# Patient Record
(demographics unavailable — no encounter records)

---

## 2024-11-13 NOTE — ASSESSMENT
[FreeTextEntry1] : 58 year old with LUTS, elevated PSA, history nephrolithiasis, low T.  With regards to history of elevated PSA, patient has strong family history prostate cancer with PSA 2.8 8/2024 (3.2 7/2023), most recent MRI 7/2023 PIRADS 1 with favorable density, prior negative biopsy 2022, and negative genetic testing. We discussed options: 1) observation for now given has been in this range since 2022, favorable density, prior negative biopsy, 2) repeat MRI at this time, or 3) select MDX to better risk stratify given strong family history.  Patient elected select MDX - low risk. Options discussed - will continue to monitor given PSA largely stable and low risk. Will plan for repeat PSA 6 months from prior (2/2025)  With regards to LUTS, we discussed the following options for managing the patient's lower urinary tract symptoms (LUTS) due to BPH:  (1) Behavioral modification: timed and double voiding, reduced oral fluid intake at night, avoid bladder irritants (caffeine, alcohol, smoking, spicy foods)  (2) Medical therapy: medication classes include alpha-blockers, 5-alpha reductase inhibitors, anticholinergics, PDE-5 inhibitors (Tadalafil is approved for co-treatment of BPH and erectile dysfunction), and combination therapy  (3) Surgical intervention: TURP (transurethral resection of the prostate), UroLIFT (prostatic urethral lift), Rezum (water vapor ablation of prostate), PVP (photovaporization of prostate), enucleation of the prostate, and simple prostatectomy   Patient not interested in surgery at this time. Flomax caused dizziness. Silodosin bothersome retrograde. Interested in trialing daily tadalafil again - felt it was somewhat helpful but was not consistent with it. The most common adverse reactions (>2%) of Cialis include headache, dyspepsia, back pain, myalgia, nasal congestion, flushing and pain in limb. There is also a risk of prolonged erection, particularly those at increased risk for priapism. He was informed that this medication could potentiate the effects of high blood pressure medications. He should stop the medication and contact medical help if any issues or concerns with treatment. He denies prior history of cardiac disease, chest pain, or use of nitrates.   History nephrolithiasis: renal US 8/2024: an echogenic focus with distal shadow and twinkle artifact measuring 4 mm visualized in the lower pole of left kidney. Options discussed - observation vs URS, vs ESWL - patient would like to continue to monitor at this time. Warning precautions discussed, plan for renal US 6 months (2/2025)  Low T - per PCP, patient to let me know if would like referral.  Plan:  - Daily tadalafil - f/u 1 months to assess symptoms on tadalafil - patient not interested in surgery at this time, potentially interested in REZUM in future - PSA 2/2025 - renal US 2/2025, warning precautions discussed

## 2024-11-13 NOTE — HISTORY OF PRESENT ILLNESS
[FreeTextEntry1] : Name TIAGO DANGELO  MRN [id number]   1966  Contact Number: [phone] ----------------------------------------------------------------------------------------------------------------------------------------- Date of First Visit: 24 Referring Provider/PCP: Dr. Melinda hayden: [phone] -----------------------------------------------------------------------------------------------------------------------------------------  CC: BPH, low T  History of Present Illness: TIAGO DANGELO is a 58 year old male who presents to [facility]. Patient has strong family history of lethal prostate cancer. In , PSA was rising to 1.6 at that time and nodule was felt on rectal exam. MRI 22: PIRADS 3 12 x8mm L mid TZa lesion. Underwent transperineal targeted prostate biopsy 22 - benign. Then a year later PSA 3.2 and had another MRI 2023 PSA 3.2 PIRADS 1 with resolution of previous lesion. Patient reports had genetic testing and was negative for any mutations. PSA has since decreased 2.6, recently 2.8.  Patient reports a few years history of bothersome LUTS. Specifically, he complains of urinary frequency, post void dribbling, stream can be weak, feeling of incomplete emptying, feels like still needs to go and nothing comes out. Has been on daily tadalafil, but has not fully compliant with daily use. Denies history of urinary retention. Reports a few years ago felt had an infection, was prescribed cipro but did not take and symptoms resolved on their own. No issues with erections.  Patient also reports had a kidney stone about 3 years ago, passed on its own. No issues since. Office US today: an echogenic focus with distal shadow and twinkle artifact measuring 4 mm visualized in the lower pole of left kidney.  Patient also has history low T - managed by PCP on T gel.  PSA Trend: 2021: 1.57 22: 2.6 23: 3.2 2023: 2.6 24: 2.8 (PSAD 0.08)  MRI: 2023: [facility] Prostate size: 3.9 cm x 4.0 cm x 4.6 cm for an overall volume of 37.3 cc (series 3, image 16 and series 7, image 16) Intra-vesical protrusion: None. Peripheral zone hemorrhage: None. No focal abnormality suspicious for tumor is identified. Previously described left anterior transition zone lesion is not identified on the present examination.  Additional peripheral zone findings: None Additional transition zone findings: Enlarged and heterogeneous in appearance, consistent with BPH. Extraprostatic extension: See above. Seminal vesicle invasion: No evidence of seminal vesicle invasion. Lymph nodes: No pathologic pelvic lymph nodes. Osseous structures: No aggressive osseous lesion. Additional findings: None. IMPRESSION: No findings on MRI suspicious for prostate tumor. PI-RADS 1: clinically significant tumor very unlikely. Previously described left anterior transition zone lesion is not identified on the present examination.   MRI 22: [facility] Prostate size: 4.2 [CC] x 3.6 [AP] x 4.5 [transverse] cm for an overall volume of 35 cc. Intra-vesical protrusion: None. Peripheral zone hemorrhage: None Lesion localization: LESION: 1 PI-RADS Assessment Category: 3, Intermediate (presence of clinically significant cancer equivocal) T2-weighted images: 3 (heterogeneous signal intensity with obscured margins; other findings not qualifying as 2, 4, or 5) Diffusion-weighted images: 3 (focal [discrete and different from background] hypointense on ADC and/or focal hyperintense on high b-value DWI; may be markedly hypointense on ADC or markedly hyperintense on high b-value DWI, but not both) Size: 12 x 8 mm as measured on image 24 of series 12 (ADC map) Side: Left Location within transverse plane: Anterior Level of prostate: Midgland Zone: Transition Extra-prostatic extension: Does not abut capsule Additional peripheral zone findings: None Additional transition zone findings: Enlarged and heterogeneous in appearance, consistent with BPH. Extraprostatic extension: No evidence of EPE. Seminal vesicle invasion: No evidence of seminal vesicle invasion. Lymph nodes: No pathologic pelvic lymph nodes. Osseous structures: No aggressive osseous lesion. Additional findings: None. IMPRESSION: 12 x 8 mm left anterior midgland transition zone lesion. PI-RADS 3, intermediate (clinically significant cancer equivocal).  IPSS 20 QOL 4 ALCIDES 18 PVR (to ensure adequate emptying): 203 ---------------------------------------------------------------------------------------------------------------------------------------- Interval History (10/02/2024):  Select MDX very low risk.  Started Flomax last visit. 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 Read about surgical options - not interested at this time.   Uroflow: plateau curve, voided 523, Qmax 13.8, classification obstructed IPSS: 25 QOL 4 ALCIDES 3 - has not been active PVR (to ensure adequate emptying): 147 ---------------------------------------------------------------------------------------------------------------------------------------- Interval History (2024):  Last visit switch to silodosin. Patient reports improvement in flow on the medication, not as much change in frequency. No dizziness. Reports was very bothered by the retrograde ejaculation and stopped the medication. Interested in trying daily tadalafil again (previously was not compliant on it). Not interested in surgery at this time, potentially interested in REZUM in new year.  Uroflow: voided 262cc, QMax 9.7 classification obstructed IPSS 20 QOL 4 ALCIDES  3 PVR (to ensure adequate emptying): 108 ---------------------------------------------------------------------------------------------------------------------------------------- PMH: HTN, stroke (no residual deficits, 2023), PFO PSH: PFO s/p repair, bicep tear s/p repair Family History: prostate cancer father - lethal,  at 81, 2 paternal uncles with prostate cancer -  from heart attack, another from prostate cancer in 70s, 2 maternal uncles with prostate cancer - alive and treated Social: , 3 children, semi retired finance, never smoker, rare alcohol Meds: atorvastatin, tadalafil, T gel, aspirin, losartan, vit D, coq10 Allergies: NKDA ROS: no feves, chills

## 2024-11-13 NOTE — LETTER BODY
[Dear  ___] : Dear  [unfilled], [Courtesy Letter:] : I had the pleasure of seeing your patient, [unfilled], in my office today. [Please see my note below.] : Please see my note below. [Consult Closing:] : Thank you very much for allowing me to participate in the care of this patient.  If you have any questions, please do not hesitate to contact me. [Sincerely,] : Sincerely, [FreeTextEntry3] : Lina Lopez MD Director of Robotic Education [facility]   [email or web address] [phone]

## 2024-12-20 NOTE — HISTORY OF PRESENT ILLNESS
[FreeTextEntry1] : Name TIAGO DANGELO  MRN [id number]   1966  Contact Number: [phone] ----------------------------------------------------------------------------------------------------------------------------------------- Date of First Visit: 24 Referring Provider/PCP: Dr. Melinda hayden: [phone] -----------------------------------------------------------------------------------------------------------------------------------------  CC: BPH, low T  History of Present Illness: TIAGO DANGELO is a 58 year old male who presents to [facility]. Patient has strong family history of lethal prostate cancer. In , PSA was rising to 1.6 at that time and nodule was felt on rectal exam. MRI 22: PIRADS 3 12 x8mm L mid TZa lesion. Underwent transperineal targeted prostate biopsy 22 - benign. Then a year later PSA 3.2 and had another MRI 2023 PSA 3.2 PIRADS 1 with resolution of previous lesion. Patient reports had genetic testing and was negative for any mutations. PSA has since decreased 2.6, recently 2.8.  Patient reports a few years history of bothersome LUTS. Specifically, he complains of urinary frequency, post void dribbling, stream can be weak, feeling of incomplete emptying, feels like still needs to go and nothing comes out. Has been on daily tadalafil, but has not fully compliant with daily use. Denies history of urinary retention. Reports a few years ago felt had an infection, was prescribed cipro but did not take and symptoms resolved on their own. No issues with erections.  Patient also reports had a kidney stone about 3 years ago, passed on its own. No issues since. Office US today: an echogenic focus with distal shadow and twinkle artifact measuring 4 mm visualized in the lower pole of left kidney.  Patient also has history low T - managed by PCP on T gel.  PSA Trend: 2021: 1.57 22: 2.6 23: 3.2 2023: 2.6 24: 2.8 (PSAD 0.08)  MRI: 2023: [facility] Prostate size: 3.9 cm x 4.0 cm x 4.6 cm for an overall volume of 37.3 cc (series 3, image 16 and series 7, image 16) Intra-vesical protrusion: None. Peripheral zone hemorrhage: None. No focal abnormality suspicious for tumor is identified. Previously described left anterior transition zone lesion is not identified on the present examination.  Additional peripheral zone findings: None Additional transition zone findings: Enlarged and heterogeneous in appearance, consistent with BPH. Extraprostatic extension: See above. Seminal vesicle invasion: No evidence of seminal vesicle invasion. Lymph nodes: No pathologic pelvic lymph nodes. Osseous structures: No aggressive osseous lesion. Additional findings: None. IMPRESSION: No findings on MRI suspicious for prostate tumor. PI-RADS 1: clinically significant tumor very unlikely. Previously described left anterior transition zone lesion is not identified on the present examination.   MRI 22: [facility] Prostate size: 4.2 [CC] x 3.6 [AP] x 4.5 [transverse] cm for an overall volume of 35 cc. Intra-vesical protrusion: None. Peripheral zone hemorrhage: None Lesion localization: LESION: 1 PI-RADS Assessment Category: 3, Intermediate (presence of clinically significant cancer equivocal) T2-weighted images: 3 (heterogeneous signal intensity with obscured margins; other findings not qualifying as 2, 4, or 5) Diffusion-weighted images: 3 (focal [discrete and different from background] hypointense on ADC and/or focal hyperintense on high b-value DWI; may be markedly hypointense on ADC or markedly hyperintense on high b-value DWI, but not both) Size: 12 x 8 mm as measured on image 24 of series 12 (ADC map) Side: Left Location within transverse plane: Anterior Level of prostate: Midgland Zone: Transition Extra-prostatic extension: Does not abut capsule Additional peripheral zone findings: None Additional transition zone findings: Enlarged and heterogeneous in appearance, consistent with BPH. Extraprostatic extension: No evidence of EPE. Seminal vesicle invasion: No evidence of seminal vesicle invasion. Lymph nodes: No pathologic pelvic lymph nodes. Osseous structures: No aggressive osseous lesion. Additional findings: None. IMPRESSION: 12 x 8 mm left anterior midgland transition zone lesion. PI-RADS 3, intermediate (clinically significant cancer equivocal).  IPSS 20 QOL 4 ALCIDES 18 PVR (to ensure adequate emptying): 203 ---------------------------------------------------------------------------------------------------------------------------------------- Interval History (10/02/2024):  Select MDX very low risk.  Started Flomax last visit. 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 Read about surgical options - not interested at this time.   Uroflow: plateau curve, voided 523, Qmax 13.8, classification obstructed IPSS: 25 QOL 4 ALCIDES 3 - has not been active PVR (to ensure adequate emptying): 147 ---------------------------------------------------------------------------------------------------------------------------------------- Interval History (2024):  Last visit switch to silodosin. Patient reports improvement in flow on the medication, not as much change in frequency. No dizziness. Reports was very bothered by the retrograde ejaculation and stopped the medication. Interested in trying daily tadalafil again (previously was not compliant on it). Not interested in surgery at this time, potentially interested in REZUM in new year.  Uroflow: voided 262cc, QMax 9.7 classification obstructed IPSS 20 QOL 4 ALCIDES  3 PVR (to ensure adequate emptying): 108 ---------------------------------------------------------------------------------------------------------------------------------------- Interval History (2024):  Last visit started daily tadalfil. Reports tolerating well. Feels urination somewhat improved. Frequency, intermittency, stream maybe a little betetr with Cialis. Most bothersome aspect is the frequency because inconvenience.  Uroflow: voided 362 Qmax 16 classification normal IPSS 17 QOL 4 ALCIDES 3 PVR (to ensure adequate emptying): 199 ---------------------------------------------------------------------------------------------------------------------------------------- PMH: HTN, stroke (no residual deficits, 2023), PFO PSH: PFO s/p repair, bicep tear s/p repair Family History: prostate cancer father - lethal,  at 81, 2 paternal uncles with prostate cancer -  from heart attack, another from prostate cancer in 70s, 2 maternal uncles with prostate cancer - alive and treated Social: , 3 children, semi retired finance, never smoker, rare alcohol Meds: atorvastatin, tadalafil, T gel, aspirin, losartan, vit D, coq10 Allergies: NKDA ROS: no feves, chills

## 2024-12-20 NOTE — LETTER BODY
[Dear  ___] : Dear  [unfilled], [Courtesy Letter:] : I had the pleasure of seeing your patient, [unfilled], in my office today. [Please see my note below.] : Please see my note below. [Consult Closing:] : Thank you very much for allowing me to participate in the care of this patient.  If you have any questions, please do not hesitate to contact me. [Sincerely,] : Sincerely, [FreeTextEntry3] : Lina Lopez MD Director of Robotic Education [facility] at [facility]   [email or web address] [phone]

## 2024-12-20 NOTE — ASSESSMENT
[FreeTextEntry1] : 58 year old with LUTS, elevated PSA, history nephrolithiasis, low T.  With regards to history of elevated PSA, patient has strong family history prostate cancer with PSA 2.8 8/2024 (3.2 7/2023), most recent MRI 7/2023 PIRADS 1 with favorable density, prior negative biopsy 2022, and negative genetic testing. We discussed options: 1) observation for now given has been in this range since 2022, favorable density, prior negative biopsy, 2) repeat MRI at this time, or 3) select MDX to better risk stratify given strong family history.  Patient elected select MDX - low risk. Options discussed - will continue to monitor given PSA largely stable and low risk. Will plan for repeat PSA 6 months from prior (2/2025)  With regards to LUTS, we discussed the following options for managing the patient's lower urinary tract symptoms (LUTS) due to BPH:  (1) Behavioral modification: timed and double voiding, reduced oral fluid intake at night, avoid bladder irritants (caffeine, alcohol, smoking, spicy foods)  (2) Medical therapy: medication classes include alpha-blockers, 5-alpha reductase inhibitors, anticholinergics, PDE-5 inhibitors (Tadalafil is approved for co-treatment of BPH and erectile dysfunction), and combination therapy  (3) Surgical intervention: TURP (transurethral resection of the prostate), UroLIFT (prostatic urethral lift), Rezum (water vapor ablation of prostate), PVP (photovaporization of prostate), enucleation of the prostate, and simple prostatectomy   Patient not interested in surgery at this time. Flomax caused dizziness. Silodosin bothersome retrograde. Trial of Cialis - feels helping - uroflow improved but still with elevated PVR. Patient would like to stay on Cialis daily for a little longer to determine impact. Discussed risks of incomplete emptying - infections, kidney issues, bladder failure. Will watch closely. Re surgery concerned re risk retrograde. Considering REZUM though understands still small risk. Discussed need for TRUS/cysto if more interested in procedure but wants to see impact Cialis for now.  History nephrolithiasis: renal US 8/2024: an echogenic focus with distal shadow and twinkle artifact measuring 4 mm visualized in the lower pole of left kidney. Options discussed - observation vs URS, vs ESWL - patient would like to continue to monitor at this time. Warning precautions discussed, plan for renal US 6 months (2/2025)  Low T - per PCP, patient to let me know if would like referral.  Plan:  - Continue daily tadalafil - patient not interested in surgery at this time, potentially interested in REZUM in future - would need cysto/TRUS - wants to hold - PSA 2/2025 - renal US 2/2025, warning precautions discussed - will reassess LUTS at Feb f/u

## 2025-02-26 NOTE — LETTER BODY
[Dear  ___] : Dear  [unfilled], [Courtesy Letter:] : I had the pleasure of seeing your patient, [unfilled], in my office today. [Please see my note below.] : Please see my note below. [Consult Closing:] : Thank you very much for allowing me to participate in the care of this patient.  If you have any questions, please do not hesitate to contact me. [Sincerely,] : Sincerely, [FreeTextEntry3] : Lina Lopez MD Director of Robotic Education [facility] at [facility]   [email or web address] [phone]

## 2025-02-26 NOTE — ASSESSMENT
[FreeTextEntry1] : 58 year old with LUTS, elevated PSA, history nephrolithiasis, low T.  With regards to history of elevated PSA, patient has strong family history prostate cancer with PSA 2.8 8/2024 (3.2 7/2023), most recent MRI 7/2023 PIRADS 1 with favorable density, prior negative biopsy 2022, and negative genetic testing. We discussed options: 1) observation for now given has been in this range since 2022, favorable density, prior negative biopsy, 2) repeat MRI at this time, or 3) select MDX to better risk stratify given strong family history.  Patient elected select MDX - low risk. Options discussed - will continue to monitor given PSA largely stable and low risk. Repeat PSA 2/25 2.4. Will continue to monitor - repeat PSA 6-12 months.  With regards to LUTS, we discussed the following options for managing the patient's lower urinary tract symptoms (LUTS) due to BPH:  (1) Behavioral modification: timed and double voiding, reduced oral fluid intake at night, avoid bladder irritants (caffeine, alcohol, smoking, spicy foods)  (2) Medical therapy: medication classes include alpha-blockers, 5-alpha reductase inhibitors, anticholinergics, PDE-5 inhibitors (Tadalafil is approved for co-treatment of BPH and erectile dysfunction), and combination therapy  (3) Surgical intervention: TURP (transurethral resection of the prostate), UroLIFT (prostatic urethral lift), Rezum (water vapor ablation of prostate), PVP (photovaporization of prostate), enucleation of the prostate, and simple prostatectomy   Patient not interested in surgery at this time. Flomax caused dizziness. Silodosin bothersome retrograde. Trial of Cialis - feels helping - symptoms improved but still with elevated PVR. Patient would like to stay on Cialis daily - not interested in adding another medication at this time. Discussed risks of incomplete emptying - infections, kidney issues, bladder failure. Will watch closely. Re surgery concerned re risk retrograde. Considering REZUM though understands still small risk. Discussed need for TRUS/cysto if more interested in procedure but wants to see impact Cialis for now. Wants to revisit TRUS/cysto in near future.  History nephrolithiasis: renal US 8/2024: an echogenic focus with distal shadow and twinkle artifact measuring 4 mm visualized in the lower pole of left kidney. US today: mm lower pole, upper pole 3.5, punctate in midpole. Options discussed - observation vs URS, vs ESWL. Given increase in size will proceed with CXT stone [place]illo at this time to better evaluate and then discuss options in detail.  Low T - per PCP, patient to let me know if would like referral.  Plan:  - Continue daily tadalafil - patient not interested in surgery at this time, potentially interested in REZUM in future - would need cysto/TRUS - will consider after next visit - PSA 6-12 months - CT stone umrillo - patient having hemorrhoid surgery next week wants to recover and will return to review ~6 weeks - will consider TRUS/cysto in future - will find out timing after hemorrhoid surgery re TRUS - fu 6 weeks

## 2025-02-26 NOTE — HISTORY OF PRESENT ILLNESS
[FreeTextEntry1] : Name TIAGO DANGELO  MRN [id number]   1966  Contact Number: [phone] ----------------------------------------------------------------------------------------------------------------------------------------- Date of First Visit: 24 Referring Provider/PCP: Dr. Melinda hayden: [phone] -----------------------------------------------------------------------------------------------------------------------------------------  CC: BPH, low T  History of Present Illness: TIAGO DANGELO is a 58 year old male who presents to [facility]. Patient has strong family history of lethal prostate cancer. In , PSA was rising to 1.6 at that time and nodule was felt on rectal exam. MRI 22: PIRADS 3 12 x8mm L mid TZa lesion. Underwent transperineal targeted prostate biopsy 22 - benign. Then a year later PSA 3.2 and had another MRI 2023 PSA 3.2 PIRADS 1 with resolution of previous lesion. Patient reports had genetic testing and was negative for any mutations. PSA has since decreased 2.6, recently 2.8.  Patient reports a few years history of bothersome LUTS. Specifically, he complains of urinary frequency, post void dribbling, stream can be weak, feeling of incomplete emptying, feels like still needs to go and nothing comes out. Has been on daily tadalafil, but has not fully compliant with daily use. Denies history of urinary retention. Reports a few years ago felt had an infection, was prescribed cipro but did not take and symptoms resolved on their own. No issues with erections.  Patient also reports had a kidney stone about 3 years ago, passed on its own. No issues since. Office US today: an echogenic focus with distal shadow and twinkle artifact measuring 4 mm visualized in the lower pole of left kidney.  Patient also has history low T - managed by PCP on T gel.  PSA Trend: 2021: 1.57 22: 2.6 23: 3.2 2023: 2.6 24: 2.8 (PSAD 0.08)  MRI: 2023: [facility] Prostate size: 3.9 cm x 4.0 cm x 4.6 cm for an overall volume of 37.3 cc (series 3, image 16 and series 7, image 16) Intra-vesical protrusion: None. Peripheral zone hemorrhage: None. No focal abnormality suspicious for tumor is identified. Previously described left anterior transition zone lesion is not identified on the present examination.  Additional peripheral zone findings: None Additional transition zone findings: Enlarged and heterogeneous in appearance, consistent with BPH. Extraprostatic extension: See above. Seminal vesicle invasion: No evidence of seminal vesicle invasion. Lymph nodes: No pathologic pelvic lymph nodes. Osseous structures: No aggressive osseous lesion. Additional findings: None. IMPRESSION: No findings on MRI suspicious for prostate tumor. PI-RADS 1: clinically significant tumor very unlikely. Previously described left anterior transition zone lesion is not identified on the present examination.   MRI 22: [facility] Prostate size: 4.2 [CC] x 3.6 [AP] x 4.5 [transverse] cm for an overall volume of 35 cc. Intra-vesical protrusion: None. Peripheral zone hemorrhage: None Lesion localization: LESION: 1 PI-RADS Assessment Category: 3, Intermediate (presence of clinically significant cancer equivocal) T2-weighted images: 3 (heterogeneous signal intensity with obscured margins; other findings not qualifying as 2, 4, or 5) Diffusion-weighted images: 3 (focal [discrete and different from background] hypointense on ADC and/or focal hyperintense on high b-value DWI; may be markedly hypointense on ADC or markedly hyperintense on high b-value DWI, but not both) Size: 12 x 8 mm as measured on image 24 of series 12 (ADC map) Side: Left Location within transverse plane: Anterior Level of prostate: Midgland Zone: Transition Extra-prostatic extension: Does not abut capsule Additional peripheral zone findings: None Additional transition zone findings: Enlarged and heterogeneous in appearance, consistent with BPH. Extraprostatic extension: No evidence of EPE. Seminal vesicle invasion: No evidence of seminal vesicle invasion. Lymph nodes: No pathologic pelvic lymph nodes. Osseous structures: No aggressive osseous lesion. Additional findings: None. IMPRESSION: 12 x 8 mm left anterior midgland transition zone lesion. PI-RADS 3, intermediate (clinically significant cancer equivocal).  IPSS 20 QOL 4 ALCIDES 18 PVR (to ensure adequate emptying): 203 ---------------------------------------------------------------------------------------------------------------------------------------- Interval History (10/02/2024):  Select MDX very low risk.  Started Flomax last visit. 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 Read about surgical options - not interested at this time.   Uroflow: plateau curve, voided 523, Qmax 13.8, classification obstructed IPSS: 25 QOL 4 ALCIDES 3 - has not been active PVR (to ensure adequate emptying): 147 ---------------------------------------------------------------------------------------------------------------------------------------- Interval History (2024):  Last visit switch to silodosin. Patient reports improvement in flow on the medication, not as much change in frequency. No dizziness. Reports was very bothered by the retrograde ejaculation and stopped the medication. Interested in trying daily tadalafil again (previously was not compliant on it). Not interested in surgery at this time, potentially interested in REZUM in new year.  Uroflow: voided 262cc, QMax 9.7 classification obstructed IPSS 20 QOL 4 ALCIDES  3 PVR (to ensure adequate emptying): 108 ---------------------------------------------------------------------------------------------------------------------------------------- Interval History (2024):  Last visit started daily tadalafil. Reports tolerating well. Feels urination somewhat improved. Frequency, intermittency, stream maybe a little better with Cialis. Most bothersome aspect is the frequency because inconvenience.  Uroflow: voided 362 Qmax 16 classification normal IPSS 17 QOL 4 ALCIDES 3 PVR (to ensure adequate emptying): 199 ---------------------------------------------------------------------------------------------------------------------------------------- Interval History (2025):  Continues on daily tadalafil - reports tolerating well - feels flow improved - still with frequency, nocturia 1x. Feels happy on Cialis given no side effect and improvement in urination though not perfect.  US today: 4mm lower pole, upper pole 3.5, punctate in midpole - denies any stone related symptoms.  PSA 2.4 25  IPSS: 14 QOL 4 ALCIDES: 3 Uroflow voided 491 Qmax 14.4 PVR (to ensure adequate emptying): 206 ---------------------------------------------------------------------------------------------------------------------------------------- PMH: HTN, stroke (no residual deficits, 2023), PFO PSH: PFO s/p repair, bicep tear s/p repair Family History: prostate cancer father - lethal,  at 81, 2 paternal uncles with prostate cancer -  from heart attack, another from prostate cancer in 70s, 2 maternal uncles with prostate cancer - alive and treated Social: , 3 children, semi retired finance, never smoker, rare alcohol Meds: atorvastatin, tadalafil, T gel, aspirin, losartan, vit D, coq10 Allergies: NKDA ROS: no fevers, chills  PSA Trend: 2021: 1.57 22: 2.6 23: 3.2 2023: 2.6 24: 2.8 (PSAD 0.08) 25 2.4

## 2025-04-09 NOTE — ASSESSMENT
[FreeTextEntry1] : 58 year old with LUTS, elevated PSA, history nephrolithiasis, low T.  With regards to history of elevated PSA, patient has strong family history prostate cancer with PSA 2.8 8/2024 (3.2 7/2023), most recent MRI 7/2023 PIRADS 1 with favorable density, prior negative biopsy 2022, and negative genetic testing. We discussed options: 1) observation for now given has been in this range since 2022, favorable density, prior negative biopsy, 2) repeat MRI at this time, or 3) select MDX to better risk stratify given strong family history.  Patient elected select MDX - low risk. Options discussed - will continue to monitor given PSA largely stable and low risk. Repeat PSA 2/25 2.4. Will continue to monitor - repeat PSA 6-12 months.  With regards to LUTS, TRUIS 46.5cc prostate with slight intravesical protrusion. Cyto trilobar hypertrophy, mild intravescial protrusion, high riding bladder neck, mild trabecualtion.  we discussed the following options for managing the patient's lower urinary tract symptoms (LUTS) due to BPH:  (1) Behavioral modification: timed and double voiding, reduced oral fluid intake at night, avoid bladder irritants (caffeine, alcohol, smoking, spicy foods)  (2) Medical therapy: medication classes include alpha-blockers, 5-alpha reductase inhibitors, anticholinergics, PDE-5 inhibitors (Tadalafil is approved for co-treatment of BPH and erectile dysfunction), and combination therapy  (3) Surgical intervention: TURP (transurethral resection of the prostate), UroLIFT (prostatic urethral lift), Rezum (water vapor ablation of prostate), PVP (photovaporization of prostate), enucleation of the prostate, and simple prostatectomy   Flomax caused dizziness. Silodosin bothersome retrograde. Trial of Cialis - feels helping - symptoms improved but still with elevated PVR and bothersome symptoms. Discussed risks of incomplete emptying - infections, kidney issues, bladder failure. Will watch closely. Re surgery concerned re risk retrograde. Considering REZUM though understands still small risk ~10%. Discussed risks of bleeding, infection, injury to surrounding structures, need for catheter potentially prolonged, risk of not helping symptoms, need for additional procedures.  History nephrolithiasis: renal US 8/2024: an echogenic focus with distal shadow and twinkle artifact measuring 4 mm visualized in the lower pole of left kidney. US : mm lower pole, upper pole 3.5, punctate in midpole. Options discussed - observation vs URS, vs ESWL. CT Stone Murillo 4/25: KIDNEYS/URETERS/URINARY BLADDER: The kidneys are normal in size and position. A tiny dot-like stone is seen at the lower level calyx of the left kidney. There is no hydronephrosis. A 6 mm cortical cyst is seen at the lower level of the left kidney. No follow-up needed. Both ureters are of normal caliber and course without stone. There is no hydroureter.  I discussed the management of kidney stones with the patient. There are several options, including surveillance (no treatment), shock wave lithotripsy, ureteroscopy.   Surveillance:  We can continue to watch the stone(s) over the next year or more. However, I explained that there is always a risk that the stone could get bigger in size or become symptomatic (pain, bleeding, urinary tract infections, kidney dysfunction from obstruction). While there is limited data examining which patients will eventually need treatment for stones that are asymptomatic, some studies suggest that 20-50% of patients will eventually seek treatment or pass a stone within 5 years. That percentage increases as the size of the stone increases.   Shock Wave Lithotripsy (SWL):  This is the least invasive form of surgery for stones and an excellent option for select stones. For ureteral stones, SWL is limited to the upper ureter. I explained how the procedure is performed and the concept behind shock waves. Procedural success is dependent on several stone and environmental factors such as the stone composition or density on CT, the presence or absence of hydronephrosis, size of the stone, stone location, and skin-to-stone distance on CT scan (patients body habitus). For these reasons, not all stones/patients are good candidates for SWL. The chances of being stone free after SWL are often much lower compared to other modalities, such as ureteroscopy, except in select cases where stone-free rates are comparable. Therefore, there is a risk that the patient would need subsequent procedures to render them stone-free. Since this is a non-invasive procedure, we are relying on the kidney to spontaneously pass the resultant stone fragments. At the same time, this procedure does carry some perioperative risks, mainly bleeding and infection, as well as the small risk of developing obstruction due to passage of stone fragments, which could require urgent placement of a double-J ureteral stent or nephrostomy tube.   Ureteroscopy:  I explained the technique in detail and how it is performed. Complete stone free rates (no residual fragments of any size) approach 90% for ureteral stones and likely range from 50-60% for renal stones. Very commonly, a ureteral stent is left in place at the conclusion of the procedure, but only if needed. I explained that if a stent is placed, it would need to be removed either cystoscopically under local anesthesia or it may have a string left externally through the urethra for removal in a few days after the procedure. Risks of ureteroscopy include, but are not limited to, bleeding, infection, injury to the bladder or ureter, ureteral perforation, ureteral stricture, residual fragments leading to subsequent symptoms or secondary procedures, and other risks involved with general anesthesia. There is also the risk that the procedure needs to be staged into more than one session based on the patient's internal anatomy and the size of the stone(s). Finally, dilation of the ureter and/or ureteral stent placement prior to definitive ureteroscopy may be necessary to achieve ureteral access safely in up to 5% of patients, particularly those who have not been previously instrumented.  Patient would like to pursue surveillance - will interpret f/u US with caution given overestimated stone burden.   Low T - per PCP, patient to let me know if would like referral.  Plan:  - Continue daily tadalafil - patient not interested in surgery at this time, potentially interested in REZUM in future - but would do later in the year - PSA 6-12 months - Renal US 6 months (~10/25) - fu 4 months, sooner if issues  In addition to today's procedure I spent an additional 41 minutes on face to face counseling, coordination of care, and clinical documentation.

## 2025-04-09 NOTE — LETTER BODY
[FreeTextEntry3] : Lina Lopez MD Director of Robotic Education [facility]   [email or web address] [phone]

## 2025-04-09 NOTE — HISTORY OF PRESENT ILLNESS
[FreeTextEntry1] : Name TIAGO DANGELO  MRN [id number]   1966  Contact Number: [phone] ----------------------------------------------------------------------------------------------------------------------------------------- Date of First Visit: 24 Referring Provider/PCP: Dr. Melinda hayden: [phone] -----------------------------------------------------------------------------------------------------------------------------------------  CC: BPH, low T  History of Present Illness: TIAGO DANGELO is a 58 year old male who presents to [facility]. Patient has strong family history of lethal prostate cancer. In , PSA was rising to 1.6 at that time and nodule was felt on rectal exam. MRI 22: PIRADS 3 12 x8mm L mid TZa lesion. Underwent transperineal targeted prostate biopsy 22 - benign. Then a year later PSA 3.2 and had another MRI 2023 PSA 3.2 PIRADS 1 with resolution of previous lesion. Patient reports had genetic testing and was negative for any mutations. PSA has since decreased 2.6, recently 2.8.  Patient reports a few years history of bothersome LUTS. Specifically, he complains of urinary frequency, post void dribbling, stream can be weak, feeling of incomplete emptying, feels like still needs to go and nothing comes out. Has been on daily tadalafil, but has not fully compliant with daily use. Denies history of urinary retention. Reports a few years ago felt had an infection, was prescribed cipro but did not take and symptoms resolved on their own. No issues with erections.  Patient also reports had a kidney stone about 3 years ago, passed on its own. No issues since. Office US today: an echogenic focus with distal shadow and twinkle artifact measuring 4 mm visualized in the lower pole of left kidney.  Patient also has history low T - managed by PCP on T gel.  PSA Trend: 2021: 1.57 22: 2.6 23: 3.2 2023: 2.6 24: 2.8 (PSAD 0.08)  MRI: 2023: [facility] Prostate size: 3.9 cm x 4.0 cm x 4.6 cm for an overall volume of 37.3 cc (series 3, image 16 and series 7, image 16) Intra-vesical protrusion: None. Peripheral zone hemorrhage: None. No focal abnormality suspicious for tumor is identified. Previously described left anterior transition zone lesion is not identified on the present examination.  Additional peripheral zone findings: None Additional transition zone findings: Enlarged and heterogeneous in appearance, consistent with BPH. Extraprostatic extension: See above. Seminal vesicle invasion: No evidence of seminal vesicle invasion. Lymph nodes: No pathologic pelvic lymph nodes. Osseous structures: No aggressive osseous lesion. Additional findings: None. IMPRESSION: No findings on MRI suspicious for prostate tumor. PI-RADS 1: clinically significant tumor very unlikely. Previously described left anterior transition zone lesion is not identified on the present examination.   MRI 22: [facility] Prostate size: 4.2 [CC] x 3.6 [AP] x 4.5 [transverse] cm for an overall volume of 35 cc. Intra-vesical protrusion: None. Peripheral zone hemorrhage: None Lesion localization: LESION: 1 PI-RADS Assessment Category: 3, Intermediate (presence of clinically significant cancer equivocal) T2-weighted images: 3 (heterogeneous signal intensity with obscured margins; other findings not qualifying as 2, 4, or 5) Diffusion-weighted images: 3 (focal [discrete and different from background] hypointense on ADC and/or focal hyperintense on high b-value DWI; may be markedly hypointense on ADC or markedly hyperintense on high b-value DWI, but not both) Size: 12 x 8 mm as measured on image 24 of series 12 (ADC map) Side: Left Location within transverse plane: Anterior Level of prostate: Midgland Zone: Transition Extra-prostatic extension: Does not abut capsule Additional peripheral zone findings: None Additional transition zone findings: Enlarged and heterogeneous in appearance, consistent with BPH. Extraprostatic extension: No evidence of EPE. Seminal vesicle invasion: No evidence of seminal vesicle invasion. Lymph nodes: No pathologic pelvic lymph nodes. Osseous structures: No aggressive osseous lesion. Additional findings: None. IMPRESSION: 12 x 8 mm left anterior midgland transition zone lesion. PI-RADS 3, intermediate (clinically significant cancer equivocal).  IPSS 20 QOL 4 ALCIDES 18 PVR (to ensure adequate emptying): 203 ---------------------------------------------------------------------------------------------------------------------------------------- Interval History (10/02/2024):  Select MDX very low risk.  Started Flomax last visit. 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 Read about surgical options - not interested at this time.   Uroflow: plateau curve, voided 523, Qmax 13.8, classification obstructed IPSS: 25 QOL 4 ALCIDES 3 - has not been active PVR (to ensure adequate emptying): 147 ---------------------------------------------------------------------------------------------------------------------------------------- Interval History (2024):  Last visit switch to silodosin. Patient reports improvement in flow on the medication, not as much change in frequency. No dizziness. Reports was very bothered by the retrograde ejaculation and stopped the medication. Interested in trying daily tadalafil again (previously was not compliant on it). Not interested in surgery at this time, potentially interested in REZUM in new year.  Uroflow: voided 262cc, QMax 9.7 classification obstructed IPSS 20 QOL 4 ALCIDES  3 PVR (to ensure adequate emptying): 108 ---------------------------------------------------------------------------------------------------------------------------------------- Interval History (2024):  Last visit started daily tadalafil. Reports tolerating well. Feels urination somewhat improved. Frequency, intermittency, stream maybe a little better with Cialis. Most bothersome aspect is the frequency because inconvenience.  Uroflow: voided 362 Qmax 16 classification normal IPSS 17 QOL 4 ALCIDES 3 PVR (to ensure adequate emptying): 199 ---------------------------------------------------------------------------------------------------------------------------------------- Interval History (2025):  Continues on daily tadalafil - reports tolerating well - feels flow improved - still with frequency, nocturia 1x. Feels happy on Cialis given no side effect and improvement in urination though not perfect.  US today: 4mm lower pole, upper pole 3.5, punctate in midpole - denies any stone related symptoms.  PSA 2.4 25  IPSS: 14 QOL 4 ALCIDES: 3 Uroflow voided 491 Qmax 14.4 PVR (to ensure adequate emptying): 206 ---------------------------------------------------------------------------------------------------------------------------------------- Interval History (2025):  Continues on daily tadalafil. Symptoms stable.   CT Stone Murillo 3/28/25: KIDNEYS/URETERS/URINARY BLADDER: The kidneys are normal in size and position. A tiny dot-like stone is seen at the lower level calyx of the left kidney. There is no hydronephrosis. A 6 mm cortical cyst is seen at the lower level of the left kidney. No follow-up needed. Both ureters are of normal caliber and course without stone. There is no hydroureter. The urinary bladder shows moderate distention without stone or wall thickening. LOWER CHEST: There is no focal consolidation or pleural effusion. LIVER: The liver is normal in size without mass. A tiny calcified granuloma seen in the right hepatic lobe. GALLBLADDER/BILIARY TREE: There is no biliary tree dilatation. The gallbladder is of normal size without wall thickening or opaque stone. No pericholecystic fluid is present.  PANCREAS: There is no mass or pancreatic ductal dilatation.  SPLEEN: The spleen is normal size without mass.  ADRENAL GLANDS: The adrenal glands are normal in size and shape without nodule.  GI TRACT: The GI tract shows no obstruction, abnormal distention or bowel wall thickening.   BLOOD VESSELS: The aorta and its major branches are of normal size.  LYMPH NODES:  No enlarged lymph node is present.   ASCITES: There is no ascites.  PELVIS: See above for urinary bladder. The prostate and seminal vesicles appear unremarkable. BONES: There is no fracture or destructive lytic or blastic lesion.   IMPRESSION: A tiny dot-like stone at the left kidney. No hydronephrosis.  TRUS 46.5 mild intravesical protrusion Cystoscopy: obstructive lateral lobes and median lobe, high riding bladder neck, slight intravesical protrusion, mild trabeculations throughout  IPSS 17 QOL 3 ALCIDES 3 Uroflow: voided 593 Qmax 15.5 PVR (to ensure adequate emptying): 215  Post cysto voided,  PVR (to ensure adequate emptying): 80  ---------------------------------------------------------------------------------------------------------------------------------------- PMH: HTN, stroke (no residual deficits, 2023), PFO PSH: PFO s/p repair, bicep tear s/p repair Family History: prostate cancer father - lethal,  at 81, 2 paternal uncles with prostate cancer -  from heart attack, another from prostate cancer in 70s, 2 maternal uncles with prostate cancer - alive and treated Social: , 3 children, semi retired finance, never smoker, rare alcohol Meds: atorvastatin, tadalafil, T gel, aspirin, losartan, vit D, coq10 Allergies: NKDA ROS: no fevers, chills  PSA Trend: 2021: 1.57 22: 2.6 23: 3.2 2023: 2.6 24: 2.8 (PSAD 0.08) 25 2.4

## 2025-04-09 NOTE — HISTORY OF PRESENT ILLNESS
[FreeTextEntry1] : Name TIAGO DANGELO  MRN [id number]   1966  Contact Number: [phone] ----------------------------------------------------------------------------------------------------------------------------------------- Date of First Visit: 24 Referring Provider/PCP: Dr. Melinda hayden: [phone] -----------------------------------------------------------------------------------------------------------------------------------------  CC: BPH, low T  History of Present Illness: TIAGO DANGELO is a 58 year old male who presents to [facility]. Patient has strong family history of lethal prostate cancer. In , PSA was rising to 1.6 at that time and nodule was felt on rectal exam. MRI 22: PIRADS 3 12 x8mm L mid TZa lesion. Underwent transperineal targeted prostate biopsy 22 - benign. Then a year later PSA 3.2 and had another MRI 2023 PSA 3.2 PIRADS 1 with resolution of previous lesion. Patient reports had genetic testing and was negative for any mutations. PSA has since decreased 2.6, recently 2.8.  Patient reports a few years history of bothersome LUTS. Specifically, he complains of urinary frequency, post void dribbling, stream can be weak, feeling of incomplete emptying, feels like still needs to go and nothing comes out. Has been on daily tadalafil, but has not fully compliant with daily use. Denies history of urinary retention. Reports a few years ago felt had an infection, was prescribed cipro but did not take and symptoms resolved on their own. No issues with erections.  Patient also reports had a kidney stone about 3 years ago, passed on its own. No issues since. Office US today: an echogenic focus with distal shadow and twinkle artifact measuring 4 mm visualized in the lower pole of left kidney.  Patient also has history low T - managed by PCP on T gel.  PSA Trend: 2021: 1.57 22: 2.6 23: 3.2 2023: 2.6 24: 2.8 (PSAD 0.08)  MRI: 2023: [facility] Prostate size: 3.9 cm x 4.0 cm x 4.6 cm for an overall volume of 37.3 cc (series 3, image 16 and series 7, image 16) Intra-vesical protrusion: None. Peripheral zone hemorrhage: None. No focal abnormality suspicious for tumor is identified. Previously described left anterior transition zone lesion is not identified on the present examination.  Additional peripheral zone findings: None Additional transition zone findings: Enlarged and heterogeneous in appearance, consistent with BPH. Extraprostatic extension: See above. Seminal vesicle invasion: No evidence of seminal vesicle invasion. Lymph nodes: No pathologic pelvic lymph nodes. Osseous structures: No aggressive osseous lesion. Additional findings: None. IMPRESSION: No findings on MRI suspicious for prostate tumor. PI-RADS 1: clinically significant tumor very unlikely. Previously described left anterior transition zone lesion is not identified on the present examination.   MRI 22: [facility] Prostate size: 4.2 [CC] x 3.6 [AP] x 4.5 [transverse] cm for an overall volume of 35 cc. Intra-vesical protrusion: None. Peripheral zone hemorrhage: None Lesion localization: LESION: 1 PI-RADS Assessment Category: 3, Intermediate (presence of clinically significant cancer equivocal) T2-weighted images: 3 (heterogeneous signal intensity with obscured margins; other findings not qualifying as 2, 4, or 5) Diffusion-weighted images: 3 (focal [discrete and different from background] hypointense on ADC and/or focal hyperintense on high b-value DWI; may be markedly hypointense on ADC or markedly hyperintense on high b-value DWI, but not both) Size: 12 x 8 mm as measured on image 24 of series 12 (ADC map) Side: Left Location within transverse plane: Anterior Level of prostate: Midgland Zone: Transition Extra-prostatic extension: Does not abut capsule Additional peripheral zone findings: None Additional transition zone findings: Enlarged and heterogeneous in appearance, consistent with BPH. Extraprostatic extension: No evidence of EPE. Seminal vesicle invasion: No evidence of seminal vesicle invasion. Lymph nodes: No pathologic pelvic lymph nodes. Osseous structures: No aggressive osseous lesion. Additional findings: None. IMPRESSION: 12 x 8 mm left anterior midgland transition zone lesion. PI-RADS 3, intermediate (clinically significant cancer equivocal).  IPSS 20 QOL 4 ALCIDES 18 PVR (to ensure adequate emptying): 203 ---------------------------------------------------------------------------------------------------------------------------------------- Interval History (10/02/2024):  Select MDX very low risk.  Started Flomax last visit. 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 Read about surgical options - not interested at this time.   Uroflow: plateau curve, voided 523, Qmax 13.8, classification obstructed IPSS: 25 QOL 4 ALCIDES 3 - has not been active PVR (to ensure adequate emptying): 147 ---------------------------------------------------------------------------------------------------------------------------------------- Interval History (2024):  Last visit switch to silodosin. Patient reports improvement in flow on the medication, not as much change in frequency. No dizziness. Reports was very bothered by the retrograde ejaculation and stopped the medication. Interested in trying daily tadalafil again (previously was not compliant on it). Not interested in surgery at this time, potentially interested in REZUM in new year.  Uroflow: voided 262cc, QMax 9.7 classification obstructed IPSS 20 QOL 4 ALCIDES  3 PVR (to ensure adequate emptying): 108 ---------------------------------------------------------------------------------------------------------------------------------------- Interval History (2024):  Last visit started daily tadalafil. Reports tolerating well. Feels urination somewhat improved. Frequency, intermittency, stream maybe a little better with Cialis. Most bothersome aspect is the frequency because inconvenience.  Uroflow: voided 362 Qmax 16 classification normal IPSS 17 QOL 4 ALCIDES 3 PVR (to ensure adequate emptying): 199 ---------------------------------------------------------------------------------------------------------------------------------------- Interval History (2025):  Continues on daily tadalafil - reports tolerating well - feels flow improved - still with frequency, nocturia 1x. Feels happy on Cialis given no side effect and improvement in urination though not perfect.  US today: 4mm lower pole, upper pole 3.5, punctate in midpole - denies any stone related symptoms.  PSA 2.4 25  IPSS: 14 QOL 4 ALCIDES: 3 Uroflow voided 491 Qmax 14.4 PVR (to ensure adequate emptying): 206 ---------------------------------------------------------------------------------------------------------------------------------------- Interval History (2025):  Continues on daily tadalafil. Symptoms stable.   CT Stone [facility] 3/28/25: KIDNEYS/URETERS/URINARY BLADDER: The kidneys are normal in size and position. A tiny dot-like stone is seen at the lower level calyx of the left kidney. There is no hydronephrosis. A 6 mm cortical cyst is seen at the lower level of the left kidney. No follow-up needed. Both ureters are of normal caliber and course without stone. There is no hydroureter. The urinary bladder shows moderate distention without stone or wall thickening. LOWER CHEST: There is no focal consolidation or pleural effusion. LIVER: The liver is normal in size without mass. A tiny calcified granuloma seen in the right hepatic lobe. GALLBLADDER/BILIARY TREE: There is no biliary tree dilatation. The gallbladder is of normal size without wall thickening or opaque stone. No pericholecystic fluid is present.  PANCREAS: There is no mass or pancreatic ductal dilatation.  SPLEEN: The spleen is normal size without mass.  ADRENAL GLANDS: The adrenal glands are normal in size and shape without nodule.  GI TRACT: The GI tract shows no obstruction, abnormal distention or bowel wall thickening.   BLOOD VESSELS: The aorta and its major branches are of normal size.  LYMPH NODES:  No enlarged lymph node is present.   ASCITES: There is no ascites.  PELVIS: See above for urinary bladder. The prostate and seminal vesicles appear unremarkable. BONES: There is no fracture or destructive lytic or blastic lesion.   IMPRESSION: A tiny dot-like stone at the left kidney. No hydronephrosis.  TRUS 46.5 mild intravesical protrusion Cystoscopy: obstructive lateral lobes and median lobe, high riding bladder neck, slight intravesical protrusion, mild trabeculations throughout  IPSS 17 QOL 3 ALCIDES 3 Uroflow: voided 593 Qmax 15.5 PVR (to ensure adequate emptying): 215  Post cysto voided,  PVR (to ensure adequate emptying): 80  ---------------------------------------------------------------------------------------------------------------------------------------- PMH: HTN, stroke (no residual deficits, 2023), PFO PSH: PFO s/p repair, bicep tear s/p repair Family History: prostate cancer father - lethal,  at 81, 2 paternal uncles with prostate cancer -  from heart attack, another from prostate cancer in 70s, 2 maternal uncles with prostate cancer - alive and treated Social: , 3 children, semi retired finance, never smoker, rare alcohol Meds: atorvastatin, tadalafil, T gel, aspirin, losartan, vit D, coq10 Allergies: NKDA ROS: no fevers, chills  PSA Trend: 2021: 1.57 22: 2.6 23: 3.2 2023: 2.6 24: 2.8 (PSAD 0.08) 25 2.4

## 2025-04-09 NOTE — LETTER BODY
[FreeTextEntry3] : Lina Lopez MD Director of Robotic Education [facility]   [email or web address] [phone]